# Patient Record
Sex: FEMALE | Race: WHITE | NOT HISPANIC OR LATINO | Employment: FULL TIME | URBAN - METROPOLITAN AREA
[De-identification: names, ages, dates, MRNs, and addresses within clinical notes are randomized per-mention and may not be internally consistent; named-entity substitution may affect disease eponyms.]

---

## 2020-07-21 ENCOUNTER — APPOINTMENT (OUTPATIENT)
Dept: CT IMAGING | Facility: HOSPITAL | Age: 25
End: 2020-07-21

## 2020-07-21 ENCOUNTER — HOSPITAL ENCOUNTER (EMERGENCY)
Facility: HOSPITAL | Age: 25
Discharge: HOME | End: 2020-07-21
Admitting: EMERGENCY MEDICINE

## 2020-07-21 VITALS
HEIGHT: 65 IN | TEMPERATURE: 98.2 F | DIASTOLIC BLOOD PRESSURE: 58 MMHG | BODY MASS INDEX: 24.83 KG/M2 | SYSTOLIC BLOOD PRESSURE: 102 MMHG | OXYGEN SATURATION: 99 % | WEIGHT: 149.03 LBS | HEART RATE: 60 BPM | RESPIRATION RATE: 15 BRPM

## 2020-07-21 DIAGNOSIS — R51.9 NONINTRACTABLE HEADACHE, UNSPECIFIED CHRONICITY PATTERN, UNSPECIFIED HEADACHE TYPE: ICD-10-CM

## 2020-07-21 DIAGNOSIS — S00.93XA CONTUSION OF HEAD, UNSPECIFIED PART OF HEAD, INITIAL ENCOUNTER: Primary | ICD-10-CM

## 2020-07-21 PROCEDURE — 99283 EMERGENCY DEPT VISIT LOW MDM: CPT

## 2020-07-21 PROCEDURE — 70450 CT HEAD/BRAIN W/O DYE: CPT

## 2020-07-21 NOTE — DISCHARGE INSTRUCTIONS
Take Tylenol as needed for headache.  Maximum of 4 g of Tylenol in 24 hours.  Drink plenty of fluids, get plenty of rest.  Try to avoid excessive phone screen or TV screen use as this could make her headaches worse.  Try to avoid reading small font or excessive reading as this could make your symptoms worse as well.    Follow-up with primary care as needed for new or worsening concerns as well as blood pressure check in the next 4 weeks.  If you start develop worsening symptoms such as worsening headache, dizziness, blurry vision, weakness, intractable nausea vomiting recommend following up with PCP or in the ER for further evaluation and management.

## 2020-07-21 NOTE — ED PROVIDER NOTES
Subjective   Chief Complaint: Head injury    Patient is a 25-year-old otherwise healthy female with no significant past medical history presents the ER with chief complaint of pressure in her head for 2 days.  Patient states that she sitting on the couch, went to lean back and hit the back of her head on the wooden frame of the couch.  Patient denies any syncope or LOC.  Patient states that approximately 12 hours after her head injury she developed a headache that she states has been intermittent.  Patient states she now has some pressure above her right eye that is constant she rates 8/10.  She states that she is felt fatigued, drowsy and some lightheadedness over the last 2 days.  She denies any blurry vision, change in her vision, chest pain, shortness of breath, abdominal pain, nausea vomiting or diarrhea.  She denies any numbness or tingling, denies any weakness or focal neurological deficits.  She denies any fever or chills.      Location: head    Quality: pressure    Duration: 2 days    Timing: constant    Severity: moderate    Associated Symptoms: lightheaded, fatigue, drowsy    PCP: Chapito Bailey      History provided by:  Patient      Review of Systems   Constitutional: Negative for chills and fever.   HENT: Negative for sore throat and trouble swallowing.    Eyes: Negative for photophobia, pain and visual disturbance.   Respiratory: Negative for shortness of breath and wheezing.    Cardiovascular: Negative for chest pain.   Gastrointestinal: Negative for abdominal pain, diarrhea, nausea and vomiting.   Genitourinary: Negative for dysuria.   Musculoskeletal: Negative for myalgias and neck pain.   Skin: Negative for rash.   Neurological: Positive for light-headedness and headaches. Negative for dizziness, seizures and syncope.   Psychiatric/Behavioral: Positive for decreased concentration. Negative for behavioral problems.   All other systems reviewed and are negative.      No past medical history on  "file.    No Known Allergies    No past surgical history on file.    No family history on file.    Social History     Socioeconomic History   • Marital status: Single     Spouse name: Not on file   • Number of children: Not on file   • Years of education: Not on file   • Highest education level: Not on file           Objective   Physical Exam   Constitutional: She is oriented to person, place, and time. She appears well-developed and well-nourished.  Non-toxic appearance. She does not appear ill. No distress.   Patient is awake, alert, oriented x3, no focal neurological deficits.   HENT:   Head: Normocephalic and atraumatic.   Mouth/Throat: Oropharynx is clear and moist.   Eyes: Pupils are equal, round, and reactive to light. EOM are normal.   Cardiovascular: Normal rate, regular rhythm, normal heart sounds and intact distal pulses.   No murmur heard.  Pulmonary/Chest: Effort normal and breath sounds normal. No respiratory distress. She has no wheezes.   Abdominal: Soft. Normal appearance and bowel sounds are normal. She exhibits no distension. There is no tenderness. There is no CVA tenderness.   Neurological: She is alert and oriented to person, place, and time. No cranial nerve deficit or sensory deficit. She exhibits normal muscle tone.   Skin: Skin is warm and dry. Capillary refill takes less than 2 seconds. No rash noted. No erythema.   Psychiatric: She has a normal mood and affect. Her behavior is normal.   Nursing note and vitals reviewed.      Procedures           ED Course    /58 (BP Location: Right arm, Patient Position: Sitting)   Pulse 60   Temp 98.2 °F (36.8 °C) (Oral)   Resp 15   Ht 165.1 cm (65\")   Wt 67.6 kg (149 lb 0.5 oz)   LMP 06/23/2020 (Approximate)   SpO2 99%   BMI 24.80 kg/m²   Labs Reviewed - No data to display  Medications - No data to display  Ct Head Without Contrast    Result Date: 7/21/2020  No acute intracranial abnormality.  Electronically Signed By-Saravanan Smith " On:7/21/2020 9:49 AM This report was finalized on [id number] by  Saravanan Smith, .                                           MDM  Number of Diagnoses or Management Options  Contusion of head, unspecified part of head, initial encounter:   Nonintractable headache, unspecified chronicity pattern, unspecified headache type:   Diagnosis management comments: MEDICAL DECISION  Epic Chart Review:   Comorbidities: Patient denies  Differentials: Concussion, contusion, intracranial hemorrhage; this list is not all inclusive and does not constitute the entirety of considered causes  Radiology interpretation:  Images reviewed by me and interpreted by radiologist,   CT head without contrast shows no acute intracranial abnormality.  Lab interpretation: Not warranted    While in the ED appropriate PPE was worn during exam and throughout all encounters with the patient.  Patient had a patient physical exam is benign, she has no sensory or focal motor neurological deficits.  Pupils are PERRLA, EOMs normal, no nystagmus bilateral fields are full, no cranial nerve deficits, patient not actively vomiting upon any nausea headache weakness or numbness or tingling.  CT of head showed no acute intracranial abnormality.  Patient was likely has mild concussion after head contusion sustained 3 days ago.  Patient is afebrile, nontoxic appearance, no active vomiting focal neurologic deficits.  Patient will be discharged advised to rest, drink plenty of fluids, avoid bright screens and reading fine print that may exacerbate her symptoms.  She was counseled on head injury precautions and advised to return to PCP or ER for further evaluation and management if her symptoms persist.  Patient and mother at bedside verbalized understanding.    Discharge plan and instructions were discussed with the patient who verbalized understanding and is in agreement with the plan, all questions were answered at this time.  Patient is aware of signs symptoms that  would require immediate return to the emergency room.  Patient understands importance of following up with primary care provider for further evaluation and worsening concerns as well as blood pressure recheck in the next 4 weeks.    Patient remained afebrile, nontoxic-appearing, no acute respiratory distress throughout entire emergency room stay.  Patient was discharged in improved stable condition with an upright steady gait.         Amount and/or Complexity of Data Reviewed  Tests in the radiology section of CPT®: reviewed and ordered    Patient Progress  Patient progress: stable      Final diagnoses:   Contusion of head, unspecified part of head, initial encounter   Nonintractable headache, unspecified chronicity pattern, unspecified headache type            Yue Krishnan PA  07/21/20 1217